# Patient Record
(demographics unavailable — no encounter records)

---
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
## 2025-03-28 NOTE — ASSESSMENT
[FreeTextEntry1] : 3/4/25 Plan: Wash with soap and water, pat dry Apply silvadene, adaptic, Kerlix, ACE Change daily Caution with checking water temperature Follow up in 2w  3/18/25 Wash with soap and water, pat dry Apply silvadene, adaptic, Kerlix, ACE - minimize silvadene between toes to prevent maceration Change daily Follow up in 2w [Wound Care] : wound care